# Patient Record
Sex: MALE | Race: WHITE | Employment: OTHER | URBAN - METROPOLITAN AREA
[De-identification: names, ages, dates, MRNs, and addresses within clinical notes are randomized per-mention and may not be internally consistent; named-entity substitution may affect disease eponyms.]

---

## 2019-09-04 ENCOUNTER — HOSPITAL ENCOUNTER (EMERGENCY)
Facility: CLINIC | Age: 29
Discharge: HOME | End: 2019-09-05
Attending: EMERGENCY MEDICINE | Admitting: EMERGENCY MEDICINE

## 2019-09-04 ENCOUNTER — APPOINTMENT (OUTPATIENT)
Dept: CT IMAGING | Facility: CLINIC | Age: 29
End: 2019-09-04
Attending: EMERGENCY MEDICINE

## 2019-09-04 DIAGNOSIS — N21.0 BLADDER STONE: ICD-10-CM

## 2019-09-04 LAB
ALBUMIN SERPL-MCNC: 3.9 G/DL (ref 3.4–5)
ALBUMIN UR-MCNC: NEGATIVE MG/DL
ALP SERPL-CCNC: 97 U/L (ref 40–150)
ALT SERPL W P-5'-P-CCNC: 45 U/L (ref 0–70)
ANION GAP SERPL CALCULATED.3IONS-SCNC: 8 MMOL/L (ref 3–14)
APPEARANCE UR: CLEAR
AST SERPL W P-5'-P-CCNC: 33 U/L (ref 0–45)
BACTERIA #/AREA URNS HPF: ABNORMAL /HPF
BASOPHILS # BLD AUTO: 0.1 10E9/L (ref 0–0.2)
BASOPHILS NFR BLD AUTO: 1.4 %
BILIRUB SERPL-MCNC: 0.7 MG/DL (ref 0.2–1.3)
BILIRUB UR QL STRIP: NEGATIVE
BUN SERPL-MCNC: 15 MG/DL (ref 7–30)
CALCIUM SERPL-MCNC: 8.7 MG/DL (ref 8.5–10.1)
CHLORIDE SERPL-SCNC: 103 MMOL/L (ref 94–109)
CK SERPL-CCNC: 239 U/L (ref 30–300)
CO2 SERPL-SCNC: 30 MMOL/L (ref 20–32)
COLOR UR AUTO: ABNORMAL
CREAT SERPL-MCNC: 0.79 MG/DL (ref 0.66–1.25)
DIFFERENTIAL METHOD BLD: ABNORMAL
EOSINOPHIL # BLD AUTO: 0 10E9/L (ref 0–0.7)
EOSINOPHIL NFR BLD AUTO: 0.7 %
ERYTHROCYTE [DISTWIDTH] IN BLOOD BY AUTOMATED COUNT: 14 % (ref 10–15)
GFR SERPL CREATININE-BSD FRML MDRD: >90 ML/MIN/{1.73_M2}
GLUCOSE SERPL-MCNC: 83 MG/DL (ref 70–99)
GLUCOSE UR STRIP-MCNC: NEGATIVE MG/DL
HCT VFR BLD AUTO: 45.7 % (ref 40–53)
HGB BLD-MCNC: 15 G/DL (ref 13.3–17.7)
HGB UR QL STRIP: ABNORMAL
IMM GRANULOCYTES # BLD: 0 10E9/L (ref 0–0.4)
IMM GRANULOCYTES NFR BLD: 0.2 %
KETONES UR STRIP-MCNC: NEGATIVE MG/DL
LEUKOCYTE ESTERASE UR QL STRIP: NEGATIVE
LIPASE SERPL-CCNC: 88 U/L (ref 73–393)
LYMPHOCYTES # BLD AUTO: 1.7 10E9/L (ref 0.8–5.3)
LYMPHOCYTES NFR BLD AUTO: 40.8 %
MCH RBC QN AUTO: 29.9 PG (ref 26.5–33)
MCHC RBC AUTO-ENTMCNC: 32.8 G/DL (ref 31.5–36.5)
MCV RBC AUTO: 91 FL (ref 78–100)
MONOCYTES # BLD AUTO: 0.4 10E9/L (ref 0–1.3)
MONOCYTES NFR BLD AUTO: 9.9 %
NEUTROPHILS # BLD AUTO: 2 10E9/L (ref 1.6–8.3)
NEUTROPHILS NFR BLD AUTO: 47 %
NITRATE UR QL: NEGATIVE
NRBC # BLD AUTO: 0 10*3/UL
NRBC BLD AUTO-RTO: 0 /100
PH UR STRIP: 6.5 PH (ref 5–7)
PLATELET # BLD AUTO: 133 10E9/L (ref 150–450)
POTASSIUM SERPL-SCNC: 3.8 MMOL/L (ref 3.4–5.3)
PROT SERPL-MCNC: 7.6 G/DL (ref 6.8–8.8)
RBC # BLD AUTO: 5.02 10E12/L (ref 4.4–5.9)
RBC #/AREA URNS AUTO: 22 /HPF (ref 0–2)
SODIUM SERPL-SCNC: 141 MMOL/L (ref 133–144)
SOURCE: ABNORMAL
SP GR UR STRIP: 1.01 (ref 1–1.03)
UROBILINOGEN UR STRIP-MCNC: NORMAL MG/DL (ref 0–2)
WBC # BLD AUTO: 4.2 10E9/L (ref 4–11)
WBC #/AREA URNS AUTO: 1 /HPF (ref 0–5)

## 2019-09-04 PROCEDURE — 96361 HYDRATE IV INFUSION ADD-ON: CPT

## 2019-09-04 PROCEDURE — 87086 URINE CULTURE/COLONY COUNT: CPT | Performed by: FAMILY MEDICINE

## 2019-09-04 PROCEDURE — 99284 EMERGENCY DEPT VISIT MOD MDM: CPT | Mod: 25

## 2019-09-04 PROCEDURE — 80053 COMPREHEN METABOLIC PANEL: CPT | Performed by: EMERGENCY MEDICINE

## 2019-09-04 PROCEDURE — 85025 COMPLETE CBC W/AUTO DIFF WBC: CPT | Performed by: EMERGENCY MEDICINE

## 2019-09-04 PROCEDURE — 82550 ASSAY OF CK (CPK): CPT | Performed by: EMERGENCY MEDICINE

## 2019-09-04 PROCEDURE — 81001 URINALYSIS AUTO W/SCOPE: CPT | Performed by: FAMILY MEDICINE

## 2019-09-04 PROCEDURE — 83690 ASSAY OF LIPASE: CPT | Performed by: EMERGENCY MEDICINE

## 2019-09-04 PROCEDURE — 99284 EMERGENCY DEPT VISIT MOD MDM: CPT | Mod: Z6 | Performed by: EMERGENCY MEDICINE

## 2019-09-04 PROCEDURE — 96374 THER/PROPH/DIAG INJ IV PUSH: CPT

## 2019-09-04 PROCEDURE — 25000128 H RX IP 250 OP 636: Performed by: EMERGENCY MEDICINE

## 2019-09-04 PROCEDURE — 74176 CT ABD & PELVIS W/O CONTRAST: CPT

## 2019-09-04 RX ORDER — KETOROLAC TROMETHAMINE 30 MG/ML
30 INJECTION, SOLUTION INTRAMUSCULAR; INTRAVENOUS ONCE
Status: COMPLETED | OUTPATIENT
Start: 2019-09-04 | End: 2019-09-04

## 2019-09-04 RX ORDER — HYDROCODONE BITARTRATE AND ACETAMINOPHEN 5; 325 MG/1; MG/1
2 TABLET ORAL ONCE
Status: COMPLETED | OUTPATIENT
Start: 2019-09-04 | End: 2019-09-05

## 2019-09-04 RX ADMIN — SODIUM CHLORIDE 1000 ML: 9 INJECTION, SOLUTION INTRAVENOUS at 22:59

## 2019-09-04 RX ADMIN — KETOROLAC TROMETHAMINE 30 MG: 30 INJECTION, SOLUTION INTRAMUSCULAR; INTRAVENOUS at 23:00

## 2019-09-04 ASSESSMENT — ENCOUNTER SYMPTOMS
DYSURIA: 0
FLANK PAIN: 1
COLOR CHANGE: 0
HEMATURIA: 1
WEAKNESS: 0
DIFFICULTY URINATING: 0
EYE REDNESS: 0
CONFUSION: 0
SHORTNESS OF BREATH: 0
ABDOMINAL PAIN: 0
NECK STIFFNESS: 0
ARTHRALGIAS: 0
FEVER: 0

## 2019-09-04 NOTE — ED AVS SNAPSHOT
[facility], [place], Emergency Department  [address]  [facility] [address]  Phone:  [phone]  Fax:  [phone]                                    Prashanth Jones   MRN: [id number]    Department:  [facility], Emergency Department   Date of Visit:  9/4/2019           After Visit Summary Signature Page    I have received my discharge instructions, and my questions have been answered. I have discussed any challenges I see with this plan with the nurse or doctor.    ..........................................................................................................................................  Patient/Patient Representative Signature      ..........................................................................................................................................  Patient Representative Print Name and Relationship to Patient    ..................................................               ................................................  Date                                   Time    ..........................................................................................................................................  Reviewed by Signature/Title    ...................................................              ..............................................  Date                                               Time          22EPIC Rev 08/18

## 2019-09-05 ENCOUNTER — TELEPHONE (OUTPATIENT)
Dept: UROLOGY | Facility: CLINIC | Age: 29
End: 2019-09-05

## 2019-09-05 ENCOUNTER — OFFICE VISIT - HEALTHEAST (OUTPATIENT)
Dept: UROLOGY | Facility: CLINIC | Age: 29
End: 2019-09-05

## 2019-09-05 ENCOUNTER — AMBULATORY - HEALTHEAST (OUTPATIENT)
Dept: UROLOGY | Facility: CLINIC | Age: 29
End: 2019-09-05

## 2019-09-05 VITALS
TEMPERATURE: 97.9 F | OXYGEN SATURATION: 100 % | DIASTOLIC BLOOD PRESSURE: 78 MMHG | SYSTOLIC BLOOD PRESSURE: 123 MMHG | HEART RATE: 59 BPM | RESPIRATION RATE: 16 BRPM | WEIGHT: 162.7 LBS

## 2019-09-05 DIAGNOSIS — N21.0 BLADDER STONE: ICD-10-CM

## 2019-09-05 LAB
ALBUMIN UR-MCNC: NEGATIVE MG/DL
APPEARANCE UR: CLEAR
BACTERIA SPEC CULT: NO GROWTH
BILIRUB UR QL STRIP: NEGATIVE
COLOR UR AUTO: YELLOW
GLUCOSE UR STRIP-MCNC: NEGATIVE MG/DL
HGB UR QL STRIP: ABNORMAL
KETONES UR STRIP-MCNC: NEGATIVE MG/DL
LEUKOCYTE ESTERASE UR QL STRIP: NEGATIVE
Lab: NORMAL
NITRATE UR QL: NEGATIVE
PH UR STRIP: 5.5 [PH] (ref 5–8)
SP GR UR STRIP: <=1.005 (ref 1–1.03)
SPECIMEN SOURCE: NORMAL
UROBILINOGEN UR STRIP-ACNC: ABNORMAL

## 2019-09-05 PROCEDURE — 25000132 ZZH RX MED GY IP 250 OP 250 PS 637: Performed by: EMERGENCY MEDICINE

## 2019-09-05 RX ADMIN — HYDROCODONE BITARTRATE AND ACETAMINOPHEN 1 TABLET: 5; 325 TABLET ORAL at 00:09

## 2019-09-05 ASSESSMENT — MIFFLIN-ST. JEOR: SCORE: 1721.96

## 2019-09-05 NOTE — ED PROVIDER NOTES
History     Chief Complaint   Patient presents with     Hematuria     was at walk in clinic today, recommended to come here due to blood in his urine. Denies pain. Hx kidney stone.      HPI  XxxtaodVdcwledfj93ftyvtqhhovetszmhmoboginccafrrijloxoufjxijmcjiqoyobuwidtnrlnltnptbxvowb.Patientstateswhenheawokethismorning,heurinatedandnoticedhisurinewasred.Hewasconcernedthatthiswasblood.Hedrankfluidsthroughoutthedaytodayandhisurinelightenedincolorbutcontinuedtoappearred.Henotesthathehashadsomeintermittent,mildleft-sidedflankpainthatradiatestothelowerabdomen.Hedeniesanyassociatednauseaorvomiting.Deniesanydiarrheaorconstipation.Hedeniesanyrecenttrauma.Hedoesstatethatheparticipatedinahighintensityworkoutyesterdaybutdeniesanymyalgias.Hehasahistoryofureteralstoneonceinthepast.Hedeniesanyfever.Nochestpainordyspnea.Patientdeniesanydysuria,urgency,orfrequency.    IhavereviewedtheMedications,Allergies,PastMedicalandSurgicalHistory,andSocialHistoryintheEpicsystem.    Review of Systems   Constitutional: Negative for fever.   HENT: Negative for congestion.    Eyes: Negative for redness.   Respiratory: Negative for shortness of breath.    Cardiovascular: Negative for chest pain.   Gastrointestinal: Negative for abdominal pain.   Genitourinary: Positive for flank pain and hematuria. Negative for difficulty urinating and dysuria.   Musculoskeletal: Negative for arthralgias and neck stiffness.   Skin: Negative for color change.   Neurological: Negative for weakness.   Psychiatric/Behavioral: Negative for confusion.   All other systems reviewed and are negative.      PhysicalExam   BP: 121/68  Pulse: 67  Temp: 97.9  F (36.6  C)  Resp: 16  Weight: 73.8 kg (162 lb 11.2 oz)  SpO2: 98 %      Physical Exam   Constitutional: He appears well-developed and well-nourished. No distress.   HENT:   Head: Normocephalic and atraumatic.   Mouth/Throat: Oropharynx is clear and moist.   Eyes: Pupils are equal, round, and reactive to light. No scleral  icterus.   Cardiovascular: Normal rate, regular rhythm, normal heart sounds and intact distal pulses.   Pulmonary/Chest: Effort normal and breath sounds normal. No respiratory distress.   Abdominal: Soft. Bowel sounds are normal. There is no tenderness.   Musculoskeletal: Normal range of motion. He exhibits no edema or tenderness.   Neurological: He is alert. He has normal strength. Gait normal.   Skin: Skin is warm. No rash noted. He is not diaphoretic.   Nursing note and vitals reviewed.      EDCourse        Procedures            CriticalCaretime:  Results for orders placed or performed during the hospital encounter of 09/04/19   CT Abdomen Pelvis w/o Contrast    Narrative    CT ABDOMEN AND PELVIS WITHOUT CONTRAST  9/4/2019 11:21 PM     HISTORY: Left flank pain and hematuria.    COMPARISON: None.    TECHNIQUE: Without intravenous or oral contrast, helical sections were  acquired from the top of the diaphragm through the pubic symphysis.  Coronal reconstructions were generated. Radiation dose for this scan  was reduced using automated exposure control, adjustment of the mA  and/or kV according to the patient's size, or iterative reconstruction  technique. (Renal stone protocol).    FINDINGS:  Right urinary tract: No renal or ureteral calculi. No dilatation of  the intrarenal collecting system or ureter.    Left urinary tract: No renal or ureteral calculi. No dilatation of the  intrarenal collecting system or ureter.    Urinary bladder: 1.3 x 0.5 cm calculus in the dependent aspect of the  bladder lumen.    Remainder of the abdomen and pelvis: A few tiny nonspecific  calcifications are scattered within the liver. The liver, spleen,  pancreas and adrenal glands are otherwise unremarkable to the limits  of a noncontrast CT scan. The gallbladder is present. The small and  large bowel are normal in caliber. The appendix is likely visualized  and within normal limits. No bowel wall thickening, pneumatosis or  free  intraperitoneal gas. No enlarged lymph nodes or free fluid in the  abdomen or pelvis.    Scan through the lower chest is unremarkable.      Impression    IMPRESSION:  1. No renal or ureteral calculi or evidence of urinary obstruction.  2. No other convincing cause of acute pain identified in the abdomen  or pelvis.  3. 1.3 x 0.5 cm urinary bladder calculus.   UA with Microscopic   Result Value Ref Range    Color Urine Light Yellow     Appearance Urine Clear     Glucose Urine Negative NEG^Negative mg/dL    Bilirubin Urine Negative NEG^Negative    Ketones Urine Negative NEG^Negative mg/dL    Specific Gravity Urine 1.006 1.003 - 1.035    Blood Urine Moderate (A) NEG^Negative    pH Urine 6.5 5.0 - 7.0 pH    Protein Albumin Urine Negative NEG^Negative mg/dL    Urobilinogen mg/dL Normal 0.0 - 2.0 mg/dL    Nitrite Urine Negative NEG^Negative    Leukocyte Esterase Urine Negative NEG^Negative    Source Midstream Urine     WBC Urine 1 0 - 5 /HPF    RBC Urine 22 (H) 0 - 2 /HPF    Bacteria Urine Few (A) NEG^Negative /HPF   CBC with platelets differential   Result Value Ref Range    WBC 4.2 4.0 - 11.0 10e9/L    RBC Count 5.02 4.4 - 5.9 10e12/L    Hemoglobin 15.0 13.3 - 17.7 g/dL    Hematocrit 45.7 40.0 - 53.0 %    MCV 91 78 - 100 fl    MCH 29.9 26.5 - 33.0 pg    MCHC 32.8 31.5 - 36.5 g/dL    RDW 14.0 10.0 - 15.0 %    Platelet Count 133 (L) 150 - 450 10e9/L    Diff Method Automated Method     % Neutrophils 47.0 %    % Lymphocytes 40.8 %    % Monocytes 9.9 %    % Eosinophils 0.7 %    % Basophils 1.4 %    % Immature Granulocytes 0.2 %    Nucleated RBCs 0 0 /100    Absolute Neutrophil 2.0 1.6 - 8.3 10e9/L    Absolute Lymphocytes 1.7 0.8 - 5.3 10e9/L    Absolute Monocytes 0.4 0.0 - 1.3 10e9/L    Absolute Eosinophils 0.0 0.0 - 0.7 10e9/L    Absolute Basophils 0.1 0.0 - 0.2 10e9/L    Abs Immature Granulocytes 0.0 0 - 0.4 10e9/L    Absolute Nucleated RBC 0.0    Comprehensive metabolic panel   Result Value Ref Range    Sodium 141 133 -  144 mmol/L    Potassium 3.8 3.4 - 5.3 mmol/L    Chloride 103 94 - 109 mmol/L    Carbon Dioxide 30 20 - 32 mmol/L    Anion Gap 8 3 - 14 mmol/L    Glucose 83 70 - 99 mg/dL    Urea Nitrogen 15 7 - 30 mg/dL    Creatinine 0.79 0.66 - 1.25 mg/dL    GFR Estimate >90 >60 mL/min/[1.73_m2]    GFR Estimate If Black >90 >60 mL/min/[1.73_m2]    Calcium 8.7 8.5 - 10.1 mg/dL    Bilirubin Total 0.7 0.2 - 1.3 mg/dL    Albumin 3.9 3.4 - 5.0 g/dL    Protein Total 7.6 6.8 - 8.8 g/dL    Alkaline Phosphatase 97 40 - 150 U/L    ALT 45 0 - 70 U/L    AST 33 0 - 45 U/L   Lipase   Result Value Ref Range    Lipase 88 73 - 393 U/L   CK total   Result Value Ref Range    CK Total 239 30 - 300 U/L   Urine Culture   Result Value Ref Range    Specimen Description Midstream Urine     Special Requests Specimen received in preservative     Culture Micro PENDING      Discussed with Urology-recommend clinic follow-up              Assessments&Plan(withMedicalDecisionMaking)   29 year old male to the emergency department with gross hematuria.  He does report some mild, intermittent left-sided flank pain today as well.  Patient denies any fever.  No nausea or vomiting.  In the emergency department, the patient appears clinically well.  He has red blood cells and few bacteria on his urinalysis.  Urine culture is pending.  The patient's CBC and metabolic panels are normal.  The patient's abdomen/pelvis CT reveals a 1.3 x 0.5 cm bladder stone.  I do not suspect that this was a ureteral stone as the patient does not describe significant pain and I would suspect he would be quite uncomfortable while passing a stone this size.  The patient's bladder stone was discussed with urology.  The patient will follow-up in clinic.  He appears clinically well and appropriate for outpatient management.    Ihavereviewedthenursingnotes.    Ihavereviewedthefindings,diagnosis,planandneedforfollowupwiththepatient.    There are no discharge medications for this patient.      Final  diagnoses:   Bladder stone       9/4/2019  [facility],[place],EMERGENCYDEPARTMENT     Gurvinder Colón MD  09/05/19 0112

## 2019-09-05 NOTE — DISCHARGE INSTRUCTIONS
Drink plenty of fluids.    Please make an appointment to follow up with Urology Clinic (phone: [phone]).    Return to the emergency department if fever, vomiting, worsening symptoms, or other concerns.

## 2019-09-05 NOTE — TELEPHONE ENCOUNTER
[facility] Call Center    Phone Message    May a detailed message be left on voicemail: yes    Reason for Call: Other: Pt calling was in ED for bladder stone was told to be seen this week by a provider and there is nothing available. Pt will only be in state untill monday 9/9/19 please return call today if possible.      Action Taken: Message routed to:  [facility] ([facility]): daria uro

## 2019-09-05 NOTE — TELEPHONE ENCOUNTER
Patient called and told we can try to see him in 2 weeks but not by Monday . Hanny Rucker, BRIAN Staff Nurse

## 2019-09-06 ENCOUNTER — SURGERY - HEALTHEAST (OUTPATIENT)
Dept: SURGERY | Facility: CLINIC | Age: 29
End: 2019-09-06

## 2019-09-06 ENCOUNTER — ANESTHESIA - HEALTHEAST (OUTPATIENT)
Dept: SURGERY | Facility: CLINIC | Age: 29
End: 2019-09-06

## 2019-09-06 ASSESSMENT — MIFFLIN-ST. JEOR: SCORE: 1704.95

## 2020-03-11 ENCOUNTER — HEALTH MAINTENANCE LETTER (OUTPATIENT)
Age: 30
End: 2020-03-11

## 2021-01-03 ENCOUNTER — HEALTH MAINTENANCE LETTER (OUTPATIENT)
Age: 31
End: 2021-01-03

## 2021-04-25 ENCOUNTER — HEALTH MAINTENANCE LETTER (OUTPATIENT)
Age: 31
End: 2021-04-25

## 2021-06-01 NOTE — ANESTHESIA PREPROCEDURE EVALUATION
Anesthesia Evaluation      Patient summary reviewed   No history of anesthetic complications     Airway   Mallampati: II  Neck ROM: full   Pulmonary - negative ROS    breath sounds clear to auscultation                         Cardiovascular   Exercise tolerance: > or = 4 METS  (-) hypertension, valvular problems/murmurs  Rhythm: regular  Rate: normal,         Neuro/Psych - negative ROS   (-) no seizures, no CVA    Endo/Other - negative ROS   (-) no diabetes, hypothyroidism     GI/Hepatic/Renal - negative ROS   (-) GERD          Dental      Comment: Beautiful dentition, denies loose teeth or anything removable                        Anesthesia Plan  Planned anesthetic: general LMA  ERAS pathway  Pre-op:  Ketorolac 15 mg  Gabapentin  Acetaminophen  GETA - LMA 5  1 PIV  Induction:  0.5 mg/kg ketamine  1-1.5 mg/kg fentanyl  1-1.5 mg/kg lidocaine  Propofol  Sevoflurane for maintenance  Dexamethasone 10 mg, ondansetron for anti-emetics  ASA 1   Induction: intravenous

## 2021-06-01 NOTE — PROGRESS NOTES
"Assessment/Plan:        Diagnoses and all orders for this visit:    Bladder stone  -     Urinalysis Macroscopic      Stone Management Plan  [facility] 9/5/2019   Urinary Tract Infection No suspicion of infection   Renal Colic Asymptomatic at this time   Renal Failure No suspicion of renal failure   Current CT date 9/4/2019   Right sided stones? No   R Stone Event No current event   Left sided stones? No   L Stone Event No current event         Subjective:      HPI  Mr. Prashanth Jones is a 29 y.o.  male presenting to the [facility] referred by Enedelia Calvin for bladder stone.    He is a first time unidentified composition stone former who has not required stone clearance procedures. He has not previously participated in stone risk evaluation. He has no identified modifiable stone risk factors. He has no identified non-modifiable stone risk factors.    He was seen at [facility] ER 9/4/19 for acute onset gross hematuria x 1 day. This occurred upon awakening and lightened up through the day, following drinking more fluids. He had participated in a high intensity workup the day prior. He has had intermittent left flank pain radiating to the lower abdomen. He states passing some stones in March which were \"sand grains\". At that time, he was experiencing left side pain and interruption of urine stream. He is currently traveling, on a tour with yoga instruction. He typically resides in both [place] and [place]. He will be leaving [place] 9/9/19.    Workup was notable for CT reporting a 1.3 cm urinary bladder stone with no hypdronephrosis or ureteral/renal stones. Labs demonstrated hematuria and mild bacteruria, normal WBC and normal renal panel. Urine culture pending. He was sent home without prescriptions and referred to Urology. He had no associated symptoms.     He is asymptomatic at present. He denies symptoms of fever, chills, flank pain, nausea, vomiting, urinary frequency and " dysuria.     CT scan from 9/4/19 is personally reviewed and demonstrates a 12 x 5 x 9 stone within dependent portion of bladder (~800 HU). No ureteral or renal stones.    Significant labs from presentation include moderate hematuria, no pyuria, negative nitrite, few bacteria, pending results on urine culture, normal WBC, normal creatinine and normal potassium.    PLAN    30 yo M with gross hematuria and recent left flank/abdominal pain, newly diagnosed bladder calculus.    Will proceed with cystolitholapaxy tomorrow. Recommend he be in touch with his insurer to evaluate financial impact. Risks and benefits were detailed including potential issues of urinary infection and postoperative issues of hematuria and bladder spasms/pain.     Patient also seen and examined by MELO Landa   Review of Systems  A 12 point comprehensive review of systems is negative except for HPI    Past Medical History:   Diagnosis Date     Bladder stone        Past Surgical History:   Procedure Laterality Date     NO PAST SURGERIES         No current outpatient medications on file.     No current facility-administered medications for this visit.        No Known Allergies    Social History     Socioeconomic History     Marital status: Single     Spouse name: Not on file     Number of children: Not on file     Years of education: Not on file     Highest education level: Not on file   Occupational History     Occupation:    Social Needs     Financial resource strain: Not on file     Food insecurity:     Worry: Not on file     Inability: Not on file     Transportation needs:     Medical: Not on file     Non-medical: Not on file   Tobacco Use     Smoking status: Never Smoker     Smokeless tobacco: Never Used   Substance and Sexual Activity     Alcohol use: Not Currently     Comment: rare     Drug use: Not on file     Sexual activity: Not on file   Lifestyle     Physical activity:     Days per week: Not on file      Minutes per session: Not on file     Stress: Not on file   Relationships     Social connections:     Talks on phone: Not on file     Gets together: Not on file     Attends Buddhist service: Not on file     Active member of club or organization: Not on file     Attends meetings of clubs or organizations: Not on file     Relationship status: Not on file     Intimate partner violence:     Fear of current or ex partner: Not on file     Emotionally abused: Not on file     Physically abused: Not on file     Forced sexual activity: Not on file   Other Topics Concern     Not on file   Social History Narrative     Not on file       Family History   Problem Relation Age of Onset     Urolithiasis Father      Diabetes Father      Cancer Maternal Grandmother         lymphoma     Cancer Maternal Grandfather         liver     Clotting disorder Neg Hx      Gout Neg Hx      Heart disease Neg Hx        Objective:      Physical Exam  Vitals:    09/05/19 1004   BP: 126/66   Pulse: 68   Temp: 97.8  F (36.6  C)     General - well developed, well nourished, appropriate for age. Appears no distress at this time   Heart - regular rate and rhythm, no murmur  Respiratory - normal effort, clear to auscultation, good air entry without adventitious noises  Abdomen - slender soft, non-tender, no hepatosplenomegaly, no masses.   - no flank tenderness, no suprapubic tenderness, kidney and bladder non-palpable  MSK - normal spinal curvature. no spinal tenderness. normal gait. muscular strength intact.  Neurology - cranial nerves II-XII grossly intact, normal sensation, no unsteadiness  Skin - intact, no bruising, no gouty tophi  Psych - oriented to time, place, and person, normal mood and affect.      Labs  Urinalysis POC (Office):  Nitrite, UA   Date Value Ref Range Status   09/05/2019 Negative Negative Final       Lab Urinalysis:  Blood, UA   Date Value Ref Range Status   09/05/2019 Large (!) Negative Final     Nitrite, UA   Date Value Ref  Range Status   09/05/2019 Negative Negative Final     Leukocytes, UA   Date Value Ref Range Status   09/05/2019 Negative Negative Final     pH, UA   Date Value Ref Range Status   09/05/2019 5.5 5.0 - 8.0 Final    and Acute Labs CBC No results found for: WBC, HGB, PLT, Renal Panel  KSINo results found for: CREATININE, CRCLEARANCE, K, CALCIUM and Urine Culture  No results found for: CULTURE

## 2021-06-01 NOTE — ANESTHESIA POSTPROCEDURE EVALUATION
Patient: Prashanth Jones  #5  CYSTOLITHOLAPAXY  Anesthesia type: general    Patient location: PACU  Last vitals:   Vitals Value Taken Time   /65 9/6/2019 12:00 PM   Temp 36.1  C (97  F) 9/6/2019 10:30 AM   Pulse 78 9/6/2019 12:15 PM   Resp 20 9/6/2019 12:00 PM   SpO2 98 % 9/6/2019 12:15 PM   Vitals shown include unvalidated device data.  Post vital signs: stable  Level of consciousness: awake and responds to simple questions  Post-anesthesia pain: pain controlled  Post-anesthesia nausea and vomiting: no  Pulmonary: unassisted, return to baseline  Cardiovascular: stable and blood pressure at baseline  Hydration: adequate  Anesthetic events: no    QCDR Measures:  ASA# 11 - [place]-op Cardiac Arrest: ASA11B - Patient did NOT experience unanticipated cardiac arrest  ASA# 12 - [place]-op Mortality Rate: ASA12B - Patient did NOT die  ASA# 13 - PACU Re-Intubation Rate: ASA13B - Patient did NOT require a new airway mgmt  ASA# 10 - Composite Anes Safety: ASA10A - No serious adverse event    Additional Notes:

## 2021-06-01 NOTE — PATIENT INSTRUCTIONS - HE
Cystoscopy and cystolitholapaxy    Cystoscopy is used to help diagnose urinary problems. Cystolitholapaxy is a surgery used to clear out bladder stones.    During a cystoscopy, your doctor examines the inside of your bladder with an instrument called a cystoscope. A cystoscope is a long, thin flexible tube with a camera at the end.    Your doctor will insert the scope into your urethra allowing him to visualize and evaluate the inside of the bladder for possible abnormalities. The urethra is the tube that carries urine to the outside of your body.    What should I expect after surgery?     You should feel normal by the next day.    Some patients find:      Bladder symptoms usually disappear by the next morning.    Small amounts of blood in the urine may be seen occasionally for up to a week.    At Home:      It is important to drink plenty of fluids after your procedure    You may continue to use your pain medications as prescribed    What symptoms should I watch for?    Fever     Chills    Increasing back pain that is not relieved with pain medications    Large amounts of blood in the urine or large clots    Leakage of urine (incontinence)     Are not able to urinate for 8 hours    These symptoms may mean you have a blockage or infection. Call the [facility] 24 hours a day at [phone] immediately.

## 2021-06-03 VITALS — WEIGHT: 158.25 LBS | BODY MASS INDEX: 22.15 KG/M2 | HEIGHT: 71 IN

## 2021-06-03 VITALS
SYSTOLIC BLOOD PRESSURE: 126 MMHG | HEIGHT: 71 IN | HEART RATE: 68 BPM | WEIGHT: 162 LBS | DIASTOLIC BLOOD PRESSURE: 66 MMHG | TEMPERATURE: 97.8 F | BODY MASS INDEX: 22.68 KG/M2

## 2021-06-03 VITALS — WEIGHT: 162 LBS

## 2021-10-10 ENCOUNTER — HEALTH MAINTENANCE LETTER (OUTPATIENT)
Age: 31
End: 2021-10-10

## 2022-05-22 ENCOUNTER — HEALTH MAINTENANCE LETTER (OUTPATIENT)
Age: 32
End: 2022-05-22

## 2022-09-18 ENCOUNTER — HEALTH MAINTENANCE LETTER (OUTPATIENT)
Age: 32
End: 2022-09-18

## 2023-06-04 ENCOUNTER — HEALTH MAINTENANCE LETTER (OUTPATIENT)
Age: 33
End: 2023-06-04

## 2024-12-19 NOTE — RESULT ENCOUNTER NOTE
Final urine culture report is NEGATIVE per [place] [facility] Lab Result protocol.    If NEGATIVE result, no change in treatment, per [place] ED Lab Result protocol. Photo Preface (Leave Blank If You Do Not Want): Photographs were obtained today Details (Free Text): AA update Detail Level: Zone